# Patient Record
Sex: MALE | Race: WHITE | NOT HISPANIC OR LATINO | Employment: FULL TIME | URBAN - NONMETROPOLITAN AREA
[De-identification: names, ages, dates, MRNs, and addresses within clinical notes are randomized per-mention and may not be internally consistent; named-entity substitution may affect disease eponyms.]

---

## 2024-05-24 ENCOUNTER — PATIENT ROUNDING (BHMG ONLY) (OUTPATIENT)
Dept: PULMONOLOGY | Facility: CLINIC | Age: 64
End: 2024-05-24
Payer: MEDICARE

## 2024-05-24 ENCOUNTER — OFFICE VISIT (OUTPATIENT)
Dept: PULMONOLOGY | Facility: CLINIC | Age: 64
End: 2024-05-24
Payer: MEDICARE

## 2024-05-24 ENCOUNTER — TELEPHONE (OUTPATIENT)
Dept: PULMONOLOGY | Facility: CLINIC | Age: 64
End: 2024-05-24

## 2024-05-24 VITALS
DIASTOLIC BLOOD PRESSURE: 70 MMHG | BODY MASS INDEX: 30.88 KG/M2 | HEART RATE: 86 BPM | OXYGEN SATURATION: 97 % | WEIGHT: 233 LBS | HEIGHT: 73 IN | SYSTOLIC BLOOD PRESSURE: 126 MMHG

## 2024-05-24 DIAGNOSIS — R91.1 LUNG NODULE SEEN ON IMAGING STUDY: Primary | ICD-10-CM

## 2024-05-24 DIAGNOSIS — Z87.891 PERSONAL HISTORY OF NICOTINE DEPENDENCE: ICD-10-CM

## 2024-05-24 DIAGNOSIS — J30.1 SEASONAL ALLERGIC RHINITIS DUE TO POLLEN: ICD-10-CM

## 2024-05-24 DIAGNOSIS — J43.2 CENTRILOBULAR EMPHYSEMA: ICD-10-CM

## 2024-05-24 PROBLEM — N40.0 BENIGN PROSTATIC HYPERPLASIA: Status: ACTIVE | Noted: 2024-05-24

## 2024-05-24 PROBLEM — J30.2 SEASONAL ALLERGIC RHINITIS: Status: ACTIVE | Noted: 2024-05-24

## 2024-05-24 PROBLEM — M25.519 SHOULDER JOINT PAIN: Status: ACTIVE | Noted: 2024-05-24

## 2024-05-24 PROBLEM — J44.9 CHRONIC OBSTRUCTIVE LUNG DISEASE: Status: ACTIVE | Noted: 2024-05-24

## 2024-05-24 PROBLEM — Z82.49 FAMILY HISTORY OF CORONARY ARTERIOSCLEROSIS: Status: ACTIVE | Noted: 2024-05-24

## 2024-05-24 PROBLEM — G47.30 SLEEP APNEA: Status: ACTIVE | Noted: 2024-05-24

## 2024-05-24 PROBLEM — R00.1 SINUS BRADYCARDIA: Status: ACTIVE | Noted: 2024-05-24

## 2024-05-24 PROBLEM — R00.2 PALPITATIONS: Status: ACTIVE | Noted: 2024-05-24

## 2024-05-24 PROBLEM — M25.559 HIP PAIN: Status: ACTIVE | Noted: 2024-05-24

## 2024-05-24 PROBLEM — R73.03 PREDIABETES: Status: ACTIVE | Noted: 2024-05-24

## 2024-05-24 PROBLEM — E78.5 HYPERLIPIDEMIA: Status: ACTIVE | Noted: 2024-05-24

## 2024-05-24 PROBLEM — M19.079 OSTEOARTHRITIS OF FOOT JOINT: Status: ACTIVE | Noted: 2024-05-24

## 2024-05-24 PROCEDURE — 3078F DIAST BP <80 MM HG: CPT | Performed by: INTERNAL MEDICINE

## 2024-05-24 PROCEDURE — 3074F SYST BP LT 130 MM HG: CPT | Performed by: INTERNAL MEDICINE

## 2024-05-24 PROCEDURE — 99204 OFFICE O/P NEW MOD 45 MIN: CPT | Performed by: INTERNAL MEDICINE

## 2024-05-24 RX ORDER — MONTELUKAST SODIUM 10 MG/1
TABLET ORAL
COMMUNITY

## 2024-05-24 RX ORDER — EZETIMIBE 10 MG/1
1 TABLET ORAL DAILY
COMMUNITY

## 2024-05-24 RX ORDER — TAMSULOSIN HYDROCHLORIDE 0.4 MG/1
CAPSULE ORAL
COMMUNITY

## 2024-05-24 RX ORDER — ALBUTEROL SULFATE 90 UG/1
2 AEROSOL, METERED RESPIRATORY (INHALATION) EVERY 4 HOURS PRN
Qty: 8 G | Refills: 5 | Status: SHIPPED | OUTPATIENT
Start: 2024-05-24

## 2024-05-24 RX ORDER — LISINOPRIL 5 MG/1
1 TABLET ORAL DAILY
COMMUNITY

## 2024-05-24 RX ORDER — ZOSTER VACCINE RECOMBINANT, ADJUVANTED 50 MCG/0.5
KIT INTRAMUSCULAR
COMMUNITY

## 2024-05-24 RX ORDER — AMOXICILLIN 500 MG
1200 CAPSULE ORAL DAILY
COMMUNITY

## 2024-05-24 RX ORDER — ALBUTEROL SULFATE 90 UG/1
AEROSOL, METERED RESPIRATORY (INHALATION)
COMMUNITY
End: 2024-05-24 | Stop reason: SDUPTHER

## 2024-05-24 RX ORDER — FLUTICASONE PROPIONATE 50 MCG
SPRAY, SUSPENSION (ML) NASAL
COMMUNITY

## 2024-05-24 RX ORDER — BISACODYL 5 MG/1
TABLET, DELAYED RELEASE ORAL
COMMUNITY
Start: 2024-05-17

## 2024-05-24 NOTE — PROGRESS NOTES
New Pulmonary Patient Office Visit      Patient Name: Ricky Zamudio    Referring Physician: No ref. provider found    Chief Complaint:    Chief Complaint   Patient presents with    Establish Care     Lung nodule       History of Present Illness: Ricky Zamudio is a 64 y.o. male who is here today to establish care with Pulmonary.     Patient is here to establish care for known emphysema and lung nodule noted on imaging.    Patient states he was diagnosed with COPD several years ago, but has noted symptoms have significantly worsened since he had COVID in 2020.  Has been on Trelegy for about 2 months and did not really notice much difference and therefore stopped taking it.  He admits that it was rather expensive and was not sure if he could continue to afford it.  Had not met deductible and therefore is unsure how much it will be once he meets deductible.  He is also now on Medicare which may make his out-of-pocket cost better.      Prior to COVID he was able to mow his yard on his own as well as weed eat for about an hour without breaking, but now has to stop 3-4 times for breaks.  Is able to walk on flat ground for about 1000 feet and then has to stop and rest.    He also is very active and works as a .  Does admit that he has to sit down quite frequently while working as standing and working is much harder than it used to be.    Subjective      Review of Systems:   Review of Systems   Respiratory:  Positive for cough, shortness of breath and wheezing.    Musculoskeletal:  Positive for arthralgias.       Past Medical History:   Past Medical History:   Diagnosis Date    Emphysema of lung     That’s what last  said?    Hypertension     Take lisinopril    Lung nodule 05/24/2024    Last dr said a small one?    Osteoarthritis     Seasonal allergic rhinitis 05/24/2024    Sleep apnea 05/24/2024       Past Surgical History:   Past Surgical History:   Procedure Laterality Date    TOTAL HIP ARTHROPLASTY Right  2017       Family History:   Family History   Problem Relation Age of Onset    Diabetes Father     Heart failure Father     Hypertension Father     Diabetes Maternal Grandmother     Diabetes Brother     Heart failure Brother     Hypertension Brother     Diabetes Sister     Heart failure Sister     Hypertension Sister        Social History:   Social History     Socioeconomic History    Marital status:    Tobacco Use    Smoking status: Former     Current packs/day: 0.00     Average packs/day: 2.0 packs/day for 37.1 years (74.1 ttl pk-yrs)     Types: Cigarettes     Start date: 1975     Quit date: 2012     Years since quittin.3    Smokeless tobacco: Never   Substance and Sexual Activity    Alcohol use: Yes     Alcohol/week: 1.0 standard drink of alcohol     Types: 1 Shots of liquor per week     Comment: A shot of bourbon about once per week    Drug use: Never    Sexual activity: Yes     Partners: Female     Birth control/protection: None       Medications:     Current Outpatient Medications:     albuterol sulfate  (90 Base) MCG/ACT inhaler, Inhale 2 puffs Every 4 (Four) Hours As Needed for Wheezing., Disp: 8 g, Rfl: 5    APPLE CIDER VINEGAR PO, Take  by mouth., Disp: , Rfl:     bisacodyl (Dulcolax) 5 MG EC tablet, Take 2 tablets by oral route for 1 day., Disp: , Rfl:     ezetimibe (ZETIA) 10 MG tablet, Take 1 tablet by mouth Daily., Disp: , Rfl:     fluticasone (FLONASE) 50 MCG/ACT nasal spray, , Disp: , Rfl:     lisinopril (PRINIVIL,ZESTRIL) 5 MG tablet, Take 1 tablet by mouth Daily., Disp: , Rfl:     montelukast (SINGULAIR) 10 MG tablet, , Disp: , Rfl:     multivitamin with minerals (CENTRUM SILVER 50+MEN PO), Take 1 tablet by mouth Daily., Disp: , Rfl:     Omega-3 Fatty Acids (fish oil) 1200 MG capsule capsule, Take 1 capsule by mouth Daily., Disp: , Rfl:     tamsulosin (FLOMAX) 0.4 MG capsule 24 hr capsule, Take 1 capsule every day by oral route for 90 days., Disp: , Rfl:     Zoster Vac  "Recomb Adjuvanted (Shingrix) 50 MCG/0.5ML reconstituted suspension, , Disp: , Rfl:     Budeson-Glycopyrrol-Formoterol (BREZTRI) 160-9-4.8 MCG/ACT aerosol inhaler, Inhale 2 puffs 2 (Two) Times a Day., Disp: 1 each, Rfl: 5    Allergies:   Allergies   Allergen Reactions    Penicillins Other (See Comments)     Unknown.  Always told by his mother that he was allergic       Objective     Physical Exam:  Vital Signs:   Vitals:    05/24/24 1337   BP: 126/70   Pulse: 86   SpO2: 97%   Weight: 106 kg (233 lb)   Height: 185.4 cm (73\")       Physical Exam  Vitals and nursing note reviewed.   Constitutional:       General: He is not in acute distress.     Appearance: He is well-developed. He is not ill-appearing.   HENT:      Head: Normocephalic and atraumatic.      Right Ear: External ear normal.      Left Ear: External ear normal.      Nose: Nose normal. No congestion or rhinorrhea.      Mouth/Throat:      Mouth: Mucous membranes are moist.      Pharynx: Oropharynx is clear. No oropharyngeal exudate or posterior oropharyngeal erythema.   Eyes:      General: No scleral icterus.        Right eye: No discharge.         Left eye: No discharge.      Extraocular Movements: Extraocular movements intact.      Conjunctiva/sclera: Conjunctivae normal.   Neck:      Trachea: No tracheal deviation.   Cardiovascular:      Rate and Rhythm: Normal rate and regular rhythm.      Heart sounds: No murmur heard.  Pulmonary:      Effort: No respiratory distress.      Breath sounds: No wheezing or rhonchi.   Abdominal:      General: There is no distension.      Palpations: Abdomen is soft.   Musculoskeletal:         General: No tenderness. Normal range of motion.      Cervical back: Normal range of motion and neck supple.      Right lower leg: No edema.      Left lower leg: No edema.      Comments: Multiple, well-healed scars on his hands and knuckles from being a , no erythema or warm joints   Skin:     General: Skin is warm and dry.      " "Findings: No rash.   Neurological:      Mental Status: He is alert and oriented to person, place, and time.      Motor: No weakness.      Coordination: Coordination normal.      Gait: Gait normal.   Psychiatric:         Mood and Affect: Mood normal.         Judgment: Judgment normal.         Results Review:   Labs: Reviewed.    April 2024 TSH 1.54, sodium 142, potassium 4.5, chloride 108, bicarb 27, creatinine 0.8, glucose 123, total protein 6.7, albumin 4.3, calcium 10.5, T. bili 0.6, AST 23, ALT 24, alk phos 105.  Hemoglobin 17.1, platelets 301, eosinophils 3.2% with absolute eosinophil count of 230  No results found for: \"CBCDIF\", \"CMP\"     Micro: As of May 24, 2024   No results found for: \"RESPCX\"  No results found for: \"BLOODCX\"  No results found for: \"URINECX\"  No results found for: \"MRSACX\"  No results found for: \"MRSAPCR\"  No results found for: \"URCX\"  No components found for: \"LOWRESPCF\"  No results found for: \"THROATCX\"  No results found for: \"CULTURES\"  No components found for: \"STREPBCX\"  No results found for: \"STREPPNEUAG\"  No results found for: \"LEGIONELLA\"  No results found for: \"MYCOPLASCX\"  No results found for: \"GCCX\"  No results found for: \"WOUNDCX\"  No results found for: \"BODYFLDCX\"    ABG: No results found for: \"PHART\", \"IHQ7DTB\", \"PO2ART\", \"HGBBG\", \"L9UBVVRL\", \"CFIO2\", \"FCOHB\", \"CARBOXYHGB\", \"FMETHB\"    Echo:     Radiology Scans:   Last CT scan was reviewed in great detail with the patient.     May 2024 CT chest showed no suspicious masses.  Mild emphysema changes.  Scattered subsolid sub-6 cm pulmonary nodules are stable.  Scattered calcified granulomas.  No consolidations.  Calcified mediastinal and hilar lymph nodes.  Advise follow-up CT scan in 1 year.    PFT IMPRESSION:   None available for review.     Assessment / Plan      Assessment/Plan:    1. Lung nodule seen on imaging study  6 mm nodule noted on CT scan.  According to report it is stable.  Repeat CT scan needed in 1 year.  This has " been ordered to be done at [facility] per patient request.    2. Centrilobular emphysema  Encouraged him to consider going back to a controller medication.  He is willing to try Breztri to see if this is cheaper for him.  Discussed risk, benefits and possible side effects of medication.  Discussed that if Breztri is not cheaper for him then we will work on patient assistance paperwork.  Request old PFTs from [facility]    - Budeson-Glycopyrrol-Formoterol (BREZTRI) 160-9-4.8 MCG/ACT aerosol inhaler; Inhale 2 puffs 2 (Two) Times a Day.  Dispense: 1 each; Refill: 5  - albuterol sulfate  (90 Base) MCG/ACT inhaler; Inhale 2 puffs Every 4 (Four) Hours As Needed for Wheezing.  Dispense: 8 g; Refill: 5  - Complete PFT - Pre & Post Bronchodilator; Future    3. Seasonal allergic rhinitis due to pollen  Continue using over-the-counter antihistamines    4. Personal history of nicotine dependence  Will be due for repeat CT scan in May 2025.  Already ordered..  -  CT Chest Low Dose Cancer Screening WO; Future       Follow Up:   Return in about 3 months (around 8/24/2024) for PFT day of clinic appointment.    Leda Bianchi MD  Pulmonary/Critical Care Physician   [facility]    This is electronically signed by Leda Bianchi MD  05/24/2024 13:43 EDT       Please note that portions of this note may have been completed with a voice recognition program. Efforts were made to edit the dictations, but occasionally words are mistranscribed.

## 2024-08-30 ENCOUNTER — OFFICE VISIT (OUTPATIENT)
Dept: PULMONOLOGY | Facility: CLINIC | Age: 64
End: 2024-08-30
Payer: MEDICARE

## 2024-08-30 VITALS
OXYGEN SATURATION: 98 % | RESPIRATION RATE: 18 BRPM | BODY MASS INDEX: 31.14 KG/M2 | DIASTOLIC BLOOD PRESSURE: 60 MMHG | WEIGHT: 235 LBS | HEIGHT: 73 IN | HEART RATE: 91 BPM | SYSTOLIC BLOOD PRESSURE: 128 MMHG

## 2024-08-30 DIAGNOSIS — Z29.11 NEED FOR RSV IMMUNIZATION: ICD-10-CM

## 2024-08-30 DIAGNOSIS — J30.1 SEASONAL ALLERGIC RHINITIS DUE TO POLLEN: ICD-10-CM

## 2024-08-30 DIAGNOSIS — K21.9 GERD WITHOUT ESOPHAGITIS: ICD-10-CM

## 2024-08-30 DIAGNOSIS — J45.40 MODERATE PERSISTENT ASTHMA WITHOUT COMPLICATION: Primary | ICD-10-CM

## 2024-08-30 DIAGNOSIS — J43.2 CENTRILOBULAR EMPHYSEMA: ICD-10-CM

## 2024-08-30 RX ORDER — OFLOXACIN 3 MG/ML
SOLUTION/ DROPS OPHTHALMIC
COMMUNITY
Start: 2024-08-22 | End: 2024-08-30

## 2024-08-30 RX ORDER — ALBUTEROL SULFATE 90 UG/1
2 AEROSOL, METERED RESPIRATORY (INHALATION) EVERY 4 HOURS PRN
Qty: 8 G | Refills: 5 | Status: SHIPPED | OUTPATIENT
Start: 2024-08-30

## 2024-08-30 RX ORDER — FLUTICASONE FUROATE, UMECLIDINIUM BROMIDE AND VILANTEROL TRIFENATATE 200; 62.5; 25 UG/1; UG/1; UG/1
1 POWDER RESPIRATORY (INHALATION)
COMMUNITY
End: 2024-08-30

## 2024-08-30 NOTE — PROGRESS NOTES
Pulmonary follow-up visit      Patient Name: Ricky Zamudio    Chief Complaint:    Chief Complaint   Patient presents with    Breathing Problem    Follow-up       Subjective: Ricky Zamudio is a 64 y.o. male who is here today for follow up.     Since last visit, his CT scan showed stable nodules. PFT's unremarkable.   He requires albuterol a few days per week, but has noticed significant improvement in symptoms since starting inhalers on a consistent basis.    Still using previous Trelegy inhalers and has not had to fill prescription yet so he is unsure of what his out-of-pocket cost will be on Medicare.    He notices he no longer has to rest as much at work when he works as a .  He does notice noxious odors and carissa environments can worsen his breathing.  He will get moderately short of breath with these exposures, but does improve with rest and inhalers.    Was sick about 3 weeks ago after his grandson was sick.      Subjective      Review of Systems:   Review of Systems   Respiratory:  Positive for shortness of breath. Negative for cough and wheezing.    Gastrointestinal:  Positive for GERD.   Musculoskeletal:  Positive for arthralgias.   Allergic/Immunologic: Positive for environmental allergies.         Social History:   Social History     Socioeconomic History    Marital status:    Tobacco Use    Smoking status: Former     Current packs/day: 0.00     Average packs/day: 2.0 packs/day for 37.1 years (74.1 ttl pk-yrs)     Types: Cigarettes     Start date: 1975     Quit date: 2012     Years since quittin.6    Smokeless tobacco: Never   Substance and Sexual Activity    Alcohol use: Yes     Alcohol/week: 1.0 standard drink of alcohol     Types: 1 Shots of liquor per week     Comment: A shot of bourbon about once per week    Drug use: Never    Sexual activity: Yes     Partners: Female     Birth control/protection: None       Medications:     Current Outpatient Medications:      "albuterol sulfate  (90 Base) MCG/ACT inhaler, Inhale 2 puffs Every 4 (Four) Hours As Needed for Wheezing., Disp: 8 g, Rfl: 5    APPLE CIDER VINEGAR PO, Take  by mouth., Disp: , Rfl:     bisacodyl (Dulcolax) 5 MG EC tablet, Take 2 tablets by oral route for 1 day., Disp: , Rfl:     diclofenac (VOLTAREN) 50 MG EC tablet, , Disp: , Rfl:     ezetimibe (ZETIA) 10 MG tablet, Take 1 tablet by mouth Daily., Disp: , Rfl:     fluticasone (FLONASE) 50 MCG/ACT nasal spray, , Disp: , Rfl:     lisinopril (PRINIVIL,ZESTRIL) 5 MG tablet, Take 1 tablet by mouth Daily., Disp: , Rfl:     montelukast (SINGULAIR) 10 MG tablet, , Disp: , Rfl:     multivitamin with minerals (CENTRUM SILVER 50+MEN PO), Take 1 tablet by mouth Daily., Disp: , Rfl:     Omega-3 Fatty Acids (fish oil) 1200 MG capsule capsule, Take 1 capsule by mouth Daily., Disp: , Rfl:     tamsulosin (FLOMAX) 0.4 MG capsule 24 hr capsule, Take 1 capsule every day by oral route for 90 days., Disp: , Rfl:     Fluticasone-Umeclidin-Vilant (TRELEGY) 100-62.5-25 MCG/ACT inhaler, Inhale 1 puff Daily., Disp: 1 each, Rfl: 5    RSVPreF3 Vac Recomb Adjuvanted (AREXVY) 120 MCG/0.5ML reconstituted suspension injection, Inject 0.5 mL into the appropriate muscle as directed by prescriber 1 (One) Time for 1 dose., Disp: 0.5 mL, Rfl: 0    RSVPreF3 Vac Recomb Adjuvanted (Arexvy) 120 MCG/0.5ML reconstituted suspension injection, Inject 0.5 mL into the appropriate muscle as directed by prescriber 1 (One) Time for 1 dose., Disp: 0.5 mL, Rfl: 0    Allergies:   Allergies   Allergen Reactions    Penicillins Other (See Comments)     Unknown.  Always told by his mother that he was allergic       Objective     Physical Exam:  Vital Signs:   Vitals:    08/30/24 1331   BP: 128/60   Pulse: 91   Resp: 18   SpO2: 98%   Weight: 107 kg (235 lb)   Height: 185.4 cm (73\")       Physical Exam  Vitals and nursing note reviewed.   Constitutional:       General: He is not in acute distress.     Appearance: He " "is well-developed. He is not ill-appearing.   HENT:      Head: Normocephalic and atraumatic.      Right Ear: Tympanic membrane and external ear normal.      Left Ear: Tympanic membrane and external ear normal.      Nose: Nose normal. No congestion or rhinorrhea.      Mouth/Throat:      Mouth: Mucous membranes are moist.      Pharynx: Oropharynx is clear. No oropharyngeal exudate or posterior oropharyngeal erythema.   Eyes:      General:         Right eye: No discharge.         Left eye: No discharge.      Extraocular Movements: Extraocular movements intact.      Conjunctiva/sclera: Conjunctivae normal.   Neck:      Trachea: No tracheal deviation.   Cardiovascular:      Rate and Rhythm: Normal rate and regular rhythm.      Heart sounds: No murmur heard.  Pulmonary:      Effort: No respiratory distress.      Breath sounds: No wheezing or rhonchi.   Abdominal:      General: There is no distension.      Palpations: Abdomen is soft.   Musculoskeletal:         General: No tenderness. Normal range of motion.      Cervical back: Normal range of motion and neck supple.      Right lower leg: No edema.      Left lower leg: No edema.   Skin:     General: Skin is warm and dry.      Findings: No rash.   Neurological:      Mental Status: He is alert and oriented to person, place, and time.      Motor: No weakness.      Coordination: Coordination normal.      Gait: Gait normal.   Psychiatric:         Mood and Affect: Mood normal.         Judgment: Judgment normal.         Results Review:   Labs: Reviewed.    April 2024 TSH 1.54, sodium 142, potassium 4.5, chloride 108, bicarb 27, creatinine 0.8, glucose 123, total protein 6.7, albumin 4.3, calcium 10.5, T. bili 0.6, AST 23, ALT 24, alk phos 105.  Hemoglobin 17.1, platelets 301, eosinophils 3.2% with absolute eosinophil count of 230  No results found for: \"CBCDIF\", \"CMP\"     Micro: As of August 30, 2024   No results found for: \"RESPCX\"  No results found for: \"BLOODCX\"  No results " "found for: \"URINECX\"  No results found for: \"MRSACX\"  No results found for: \"MRSAPCR\"  No results found for: \"URCX\"  No components found for: \"LOWRESPCF\"  No results found for: \"THROATCX\"  No results found for: \"CULTURES\"  No components found for: \"STREPBCX\"  No results found for: \"STREPPNEUAG\"  No results found for: \"LEGIONELLA\"  No results found for: \"MYCOPLASCX\"  No results found for: \"GCCX\"  No results found for: \"WOUNDCX\"  No results found for: \"BODYFLDCX\"    ABG: No results found for: \"PHART\", \"SRX0VAE\", \"PO2ART\", \"HGBBG\", \"J3WEJETT\", \"CFIO2\", \"FCOHB\", \"CARBOXYHGB\", \"FMETHB\"    Echo:     Radiology Scans:   Last CT scan was reviewed in great detail with the patient.     May 2024 CT chest showed no suspicious masses.  Mild emphysema changes.  Scattered subsolid sub-6 cm pulmonary nodules are stable.  Scattered calcified granulomas.  No consolidations.  Calcified mediastinal and hilar lymph nodes.  Advise follow-up CT scan in 1 year.    PFT IMPRESSION:   August 2024 PFT showed FEV1 87%, FEV1/FVC ratio showed 75. No significant bronchodilator response. ERV 2%. No hyperinflation or air trapping. Normal DLCO/VA.     Assessment / Plan      Assessment/Plan:    1. Moderate persistent asthma without complication  Much improved symptoms with consistent use of Trelegy.  Advise given PFTs appear to be well-controlled would decrease down to lower dose Trelegy.  If he does well on this may even be able to go down to Breo only at next clinic visit.  Reviewed that his PFTs are consistent with well-controlled asthma and not COPD/emphysema.    - albuterol sulfate  (90 Base) MCG/ACT inhaler; Inhale 2 puffs Every 4 (Four) Hours As Needed for Wheezing.  Dispense: 8 g; Refill: 5  - Fluticasone-Umeclidin-Vilant (TRELEGY) 100-62.5-25 MCG/ACT inhaler; Inhale 1 puff Daily.  Dispense: 1 each; Refill: 5    2. Seasonal allergic rhinitis due to pollen  Advised to continue use over-the-counter antihistamines    3. GERD without " esophagitis  Appears to be well-controlled with as needed medications.  Advised that we will put him on a consistent medication if he noticed significant increase in his GERD symptoms.    4. Need for RSV immunization  Patient qualifies for RSV vaccine.  - RSVPreF3 Vac Recomb Adjuvanted (AREXVY) 120 MCG/0.5ML reconstituted suspension injection; Inject 0.5 mL into the appropriate muscle as directed by prescriber 1 (One) Time for 1 dose.  Dispense: 0.5 mL; Refill: 0          Follow Up:   Return in about 6 months (around 2/28/2025).    Leda Bianchi MD  Pulmonary/Critical Care Physician   [facility]    This is electronically signed by Leda Bianchi MD  05/24/2024 13:43 EDT       Please note that portions of this note may have been completed with a voice recognition program. Efforts were made to edit the dictations, but occasionally words are mistranscribed.

## 2025-02-21 ENCOUNTER — OFFICE VISIT (OUTPATIENT)
Dept: PULMONOLOGY | Facility: CLINIC | Age: 65
End: 2025-02-21
Payer: MEDICARE

## 2025-02-21 VITALS
BODY MASS INDEX: 30.35 KG/M2 | DIASTOLIC BLOOD PRESSURE: 72 MMHG | HEIGHT: 73 IN | WEIGHT: 229 LBS | SYSTOLIC BLOOD PRESSURE: 132 MMHG | OXYGEN SATURATION: 97 % | HEART RATE: 62 BPM

## 2025-02-21 DIAGNOSIS — J45.20 MILD INTERMITTENT ASTHMA WITHOUT COMPLICATION: ICD-10-CM

## 2025-02-21 DIAGNOSIS — F17.211 CIGARETTE NICOTINE DEPENDENCE IN REMISSION: ICD-10-CM

## 2025-02-21 DIAGNOSIS — J45.20 MILD INTERMITTENT ASTHMA WITHOUT COMPLICATION: Primary | ICD-10-CM

## 2025-02-21 DIAGNOSIS — J30.1 SEASONAL ALLERGIC RHINITIS DUE TO POLLEN: ICD-10-CM

## 2025-02-21 DIAGNOSIS — K21.9 GERD WITHOUT ESOPHAGITIS: ICD-10-CM

## 2025-02-21 DIAGNOSIS — Z01.89 RESPIRATORY CLEARANCE EXAMINATION, ENCOUNTER FOR: ICD-10-CM

## 2025-02-21 RX ORDER — DICLOFENAC SODIUM 75 MG/1
TABLET, DELAYED RELEASE ORAL
COMMUNITY
Start: 2025-01-27

## 2025-02-21 RX ORDER — MONTELUKAST SODIUM 10 MG/1
10 TABLET ORAL NIGHTLY
Qty: 90 TABLET | Refills: 3 | Status: SHIPPED | OUTPATIENT
Start: 2025-02-21

## 2025-02-21 RX ORDER — ALBUTEROL SULFATE 90 UG/1
2 INHALANT RESPIRATORY (INHALATION) EVERY 4 HOURS PRN
Qty: 8 G | Refills: 5 | Status: SHIPPED | OUTPATIENT
Start: 2025-02-21

## 2025-02-21 RX ORDER — FLUTICASONE PROPIONATE 50 MCG
2 SPRAY, SUSPENSION (ML) NASAL DAILY
Qty: 16 G | Refills: 5 | Status: SHIPPED | OUTPATIENT
Start: 2025-02-21

## 2025-02-21 NOTE — PROGRESS NOTES
Pulmonary follow-up visit      Patient Name: Ricky Zamudio    Chief Complaint:    Chief Complaint   Patient presents with    Breathing Problem       Subjective: Ricky Zamudio is a 64 y.o. male who is here today for follow up.     Since last visit, he has been doing well.  He actually has not been using Trelegy for quite some time.  Requires albuterol once every 2 to 3 weeks.  Does use Singulair on a consistent basis.  Feels like his breathing has been doing fine.      He admits that he has been a little bit more sedentary because he needs hip surgery and this is planned with Dr. Benavides.  Needs surgical clearance for this.    He does walk with a cane secondary to the hip pain.   Denies any current shortness of breath.    Denies any nighttime awakenings.    No recent exacerbations.      He continues to work as a  and will notice sometimes difficulty with noxious odors, but improves with albuterol.      Subjective      Review of Systems:   Review of Systems   Respiratory:  Negative for cough, shortness of breath and wheezing.    Gastrointestinal:  Positive for GERD.   Musculoskeletal:  Positive for arthralgias.   Allergic/Immunologic: Positive for environmental allergies.         Social History:   Social History     Socioeconomic History    Marital status:    Tobacco Use    Smoking status: Former     Current packs/day: 0.00     Average packs/day: 2.0 packs/day for 37.1 years (74.1 ttl pk-yrs)     Types: Cigarettes     Start date: 1975     Quit date: 2012     Years since quittin.0     Passive exposure: Past    Smokeless tobacco: Never   Substance and Sexual Activity    Alcohol use: Yes     Alcohol/week: 1.0 standard drink of alcohol     Types: 1 Shots of liquor per week     Comment: A shot of bourbon about once per week    Drug use: Never    Sexual activity: Yes     Partners: Female     Birth control/protection: None       Medications:     Current Outpatient Medications:     albuterol  "sulfate  (90 Base) MCG/ACT inhaler, Inhale 2 puffs Every 4 (Four) Hours As Needed for Wheezing., Disp: 8 g, Rfl: 5    APPLE CIDER VINEGAR PO, Take  by mouth., Disp: , Rfl:     diclofenac (VOLTAREN) 75 MG EC tablet, , Disp: , Rfl:     ezetimibe (ZETIA) 10 MG tablet, Take 1 tablet by mouth Daily., Disp: , Rfl:     fluticasone (FLONASE) 50 MCG/ACT nasal spray, Administer 2 sprays into the nostril(s) as directed by provider Daily., Disp: 16 g, Rfl: 5    lisinopril (PRINIVIL,ZESTRIL) 5 MG tablet, Take 1 tablet by mouth Daily., Disp: , Rfl:     montelukast (SINGULAIR) 10 MG tablet, Take 1 tablet by mouth Every Night., Disp: 90 tablet, Rfl: 3    multivitamin with minerals (CENTRUM SILVER 50+MEN PO), Take 1 tablet by mouth Daily., Disp: , Rfl:     Omega-3 Fatty Acids (fish oil) 1200 MG capsule capsule, Take 1 capsule by mouth Daily., Disp: , Rfl:     tamsulosin (FLOMAX) 0.4 MG capsule 24 hr capsule, Take 1 capsule every day by oral route for 90 days., Disp: , Rfl:     Allergies:   Allergies   Allergen Reactions    Penicillins Other (See Comments)     Unknown.  Always told by his mother that he was allergic       Objective     Physical Exam:  Vital Signs:   Vitals:    02/21/25 0915   BP: 132/72   Pulse: 62   SpO2: 97%   Weight: 104 kg (229 lb)   Height: 185.4 cm (73\")         Physical Exam  Vitals and nursing note reviewed.   Constitutional:       General: He is not in acute distress.     Appearance: He is well-developed. He is not ill-appearing.   HENT:      Head: Normocephalic and atraumatic.      Right Ear: External ear normal.      Left Ear: External ear normal.      Nose: Nose normal. No congestion or rhinorrhea.      Mouth/Throat:      Mouth: Mucous membranes are moist.      Pharynx: Oropharynx is clear.   Eyes:      Extraocular Movements: Extraocular movements intact.      Conjunctiva/sclera: Conjunctivae normal.   Neck:      Trachea: No tracheal deviation.   Cardiovascular:      Rate and Rhythm: Normal rate " "and regular rhythm.      Heart sounds: No murmur heard.  Pulmonary:      Breath sounds: Normal breath sounds. No wheezing or rhonchi.   Abdominal:      General: There is no distension.      Palpations: Abdomen is soft.   Musculoskeletal:         General: No tenderness. Normal range of motion.      Cervical back: Normal range of motion and neck supple.      Right lower leg: No edema.      Left lower leg: No edema.      Comments: Walking with cane.  Modified gait secondary to hip pain   Skin:     General: Skin is warm and dry.      Findings: No rash.   Neurological:      Mental Status: He is alert and oriented to person, place, and time.      Motor: No weakness.      Coordination: Coordination normal.   Psychiatric:         Mood and Affect: Mood normal.         Behavior: Behavior normal.         Thought Content: Thought content normal.         Judgment: Judgment normal.       Results Review:   Labs: Reviewed.    April 2024 TSH 1.54, sodium 142, potassium 4.5, chloride 108, bicarb 27, creatinine 0.8, glucose 123, total protein 6.7, albumin 4.3, calcium 10.5, T. bili 0.6, AST 23, ALT 24, alk phos 105.  Hemoglobin 17.1, platelets 301, eosinophils 3.2% with absolute eosinophil count of 230  No results found for: \"CBCDIF\", \"CMP\"     Micro: As of February 21, 2025   No results found for: \"RESPCX\"  No results found for: \"BLOODCX\"  No results found for: \"URINECX\"  No results found for: \"MRSACX\"  No results found for: \"MRSAPCR\"  No results found for: \"URCX\"  No components found for: \"LOWRESPCF\"  No results found for: \"THROATCX\"  No results found for: \"CULTURES\"  No components found for: \"STREPBCX\"  No results found for: \"STREPPNEUAG\"  No results found for: \"LEGIONELLA\"  No results found for: \"MYCOPLASCX\"  No results found for: \"GCCX\"  No results found for: \"WOUNDCX\"  No results found for: \"BODYFLDCX\"    ABG: No results found for: \"PHART\", \"YUV1MLJ\", \"PO2ART\", \"HGBBG\", \"I9QWIILL\", \"CFIO2\", \"FCOHB\", \"CARBOXYHGB\", " "\"FMETHB\"    Echo:     Radiology Scans:   Last CT scan was reviewed in great detail with the patient.     May 2024 CT chest showed no suspicious masses.  Mild emphysema changes.  Scattered subsolid sub-6 cm pulmonary nodules are stable.  Scattered calcified granulomas.  No consolidations.  Calcified mediastinal and hilar lymph nodes.  Advise follow-up CT scan in 1 year.    PFT IMPRESSION:   February 2025 spirometry showed FEV1 86%, FEV1/FVC ratio 75.  No significant bronchodilator responsiveness noted.    August 2024 PFT showed FEV1 87%, FEV1/FVC ratio showed 75. No significant bronchodilator response. ERV 2%. No hyperinflation or air trapping. Normal DLCO/VA.     Assessment / Plan      Assessment/Plan:    1. Mild intermittent asthma without complication  Symptoms more consistent with mild intermittent asthma and appears to be well-controlled with as needed albuterol and Singulair.  Spirometry today reviewed and shows no need for controller medications at this time.    - Spirometry - Pre & Post Bronchodilator; Future  - Complete PFT - Pre & Post Bronchodilator; Future  - montelukast (SINGULAIR) 10 MG tablet; Take 1 tablet by mouth Every Night.  Dispense: 90 tablet; Refill: 3  - albuterol sulfate  (90 Base) MCG/ACT inhaler; Inhale 2 puffs Every 4 (Four) Hours As Needed for Wheezing.  Dispense: 8 g; Refill: 5    2. Seasonal allergic rhinitis due to pollen  Continue with Singulair and Flonase  - montelukast (SINGULAIR) 10 MG tablet; Take 1 tablet by mouth Every Night.  Dispense: 90 tablet; Refill: 3  - fluticasone (FLONASE) 50 MCG/ACT nasal spray; Administer 2 sprays into the nostril(s) as directed by provider Daily.  Dispense: 16 g; Refill: 5    3. GERD without esophagitis  Well-controlled    4. Cigarette nicotine dependence in remission  Due for low-dose lung cancer screening in May 2025.  Already ordered.      5.  Surgical clearance  Patient with known lung disease, but I do not see any reason from a " respiratory standpoint that they cannot proceed with surgery.  They will be high risk for pulmonary complications, but lung disease should not prevent medically necessary surgery.  To reduce risk of pulmonary complications of surgery would recommend scheduled nebulizer treatments pre and postop and extubation as soon as clinically feasible.      Follow Up:   Return in about 6 months (around 8/21/2025) for PFT day of clinic appointment.    Leda Bianchi MD  Pulmonary/Critical Care Physician   [facility]    This is electronically signed by Leda Bianchi MD  05/24/2024 13:43 EDT       Please note that portions of this note may have been completed with a voice recognition program. Efforts were made to edit the dictations, but occasionally words are mistranscribed.

## 2025-05-20 ENCOUNTER — TELEPHONE (OUTPATIENT)
Dept: PULMONOLOGY | Facility: CLINIC | Age: 65
End: 2025-05-20
Payer: MEDICARE

## 2025-05-20 NOTE — TELEPHONE ENCOUNTER
Reached out to patient to see if we plans to complete CT.  Patient declined this can be discussed at visit in August.